# Patient Record
Sex: MALE | Race: WHITE | NOT HISPANIC OR LATINO | Employment: FULL TIME | URBAN - METROPOLITAN AREA
[De-identification: names, ages, dates, MRNs, and addresses within clinical notes are randomized per-mention and may not be internally consistent; named-entity substitution may affect disease eponyms.]

---

## 2017-10-25 ENCOUNTER — TELEPHONE (OUTPATIENT)
Dept: RHEUMATOLOGY | Facility: CLINIC | Age: 47
End: 2017-10-25

## 2017-10-25 NOTE — TELEPHONE ENCOUNTER
----- Message from Riya Miguel sent at 10/23/2017  4:32 PM CDT -----  Regarding: External Referral  Pt requesting NP consult with this Dr. Please call pt to schedule. Thank you

## 2017-10-27 ENCOUNTER — OFFICE VISIT (OUTPATIENT)
Dept: RHEUMATOLOGY | Facility: CLINIC | Age: 47
End: 2017-10-27
Payer: COMMERCIAL

## 2017-10-27 VITALS
WEIGHT: 239.63 LBS | DIASTOLIC BLOOD PRESSURE: 95 MMHG | HEART RATE: 86 BPM | RESPIRATION RATE: 20 BRPM | SYSTOLIC BLOOD PRESSURE: 129 MMHG

## 2017-10-27 DIAGNOSIS — M10.072 ACUTE IDIOPATHIC GOUT OF LEFT FOOT: ICD-10-CM

## 2017-10-27 DIAGNOSIS — M1A.09X0 IDIOPATHIC CHRONIC GOUT OF MULTIPLE SITES WITHOUT TOPHUS: Primary | ICD-10-CM

## 2017-10-27 PROCEDURE — 99999 PR PBB SHADOW E&M-EST. PATIENT-LVL III: CPT | Mod: PBBFAC,,, | Performed by: INTERNAL MEDICINE

## 2017-10-27 PROCEDURE — 99245 OFF/OP CONSLTJ NEW/EST HI 55: CPT | Mod: S$GLB,,, | Performed by: INTERNAL MEDICINE

## 2017-10-27 RX ORDER — CHLORTHALIDONE 25 MG/1
TABLET ORAL
COMMUNITY
Start: 2017-08-01

## 2017-10-27 RX ORDER — FEBUXOSTAT 40 MG/1
TABLET ORAL
COMMUNITY
Start: 2017-09-11 | End: 2018-07-23 | Stop reason: SDUPTHER

## 2017-10-27 RX ORDER — ROSUVASTATIN CALCIUM 10 MG/1
TABLET, COATED ORAL
COMMUNITY
Start: 2017-09-12 | End: 2018-03-29 | Stop reason: ALTCHOICE

## 2017-10-27 RX ORDER — CARVEDILOL 12.5 MG/1
TABLET ORAL
COMMUNITY
Start: 2017-09-25

## 2017-10-27 RX ORDER — AMLODIPINE BESYLATE 10 MG/1
TABLET ORAL
COMMUNITY
Start: 2017-10-12

## 2017-10-27 RX ORDER — PREDNISONE 10 MG/1
5 TABLET ORAL
COMMUNITY
Start: 2017-10-24 | End: 2018-03-29

## 2017-10-27 RX ORDER — INDOMETHACIN 50 MG/1
CAPSULE ORAL
COMMUNITY
Start: 2017-10-25 | End: 2018-03-29 | Stop reason: SDUPTHER

## 2017-10-27 RX ORDER — COLCHICINE 0.6 MG/1
TABLET ORAL
COMMUNITY
Start: 2017-10-18 | End: 2018-03-29

## 2017-10-27 RX ORDER — HYDROCODONE BITARTRATE AND ACETAMINOPHEN 7.5; 325 MG/1; MG/1
TABLET ORAL
COMMUNITY
Start: 2017-10-23

## 2017-10-27 ASSESSMENT — ROUTINE ASSESSMENT OF PATIENT INDEX DATA (RAPID3)
TOTAL RAPID3 SCORE: 3.67
PSYCHOLOGICAL DISTRESS SCORE: 1.1
WHEN YOU AWAKENED IN THE MORNING OVER THE LAST WEEK, PLEASE INDICATE THE AMOUNT OF TIME IT TAKES UNTIL YOU ARE AS LIMBER AS YOU WILL BE FOR THE DAY: 30 MIN
FATIGUE SCORE: .5
PAIN SCORE: 5
PATIENT GLOBAL ASSESSMENT SCORE: 4
MDHAQ FUNCTION SCORE: .6
AM STIFFNESS SCORE: 1, YES

## 2017-10-27 NOTE — LETTER
October 27, 2017      Alfa Rg MD  [address]  [place] [address]           [facility]  [address]  [place] [address]  Phone: [phone]  Fax: [phone]          Patient: Len Mcallister   MR Number: [id number]   YOB: 1970   Date of Visit: 10/27/2017       Dear Dr. Alfa Rg:    Thank you for referring Len Mcallister to me for evaluation. Attached you will find relevant portions of my assessment and plan of care.    If you have questions, please do not hesitate to call me. I look forward to following Len Mcallister along with you.    Sincerely,    Mekhi Goff MD    Enclosure  CC:  No Recipients    If you would like to receive this communication electronically, please contact [email or web address] or [phone] to request more information on [employer] Link access.    For providers and/or their staff who would like to refer a patient to [facility], please contact us through our one-stop-shop provider referral line, [facility], at [phone].    If you feel you have received this communication in error or would no longer like to receive these types of communications, please e-mail [email or web address]

## 2017-10-31 NOTE — PROGRESS NOTES
History of present illness: 46 showed gentleman was originally diagnosed as having gout 20 years ago.  It began initially in the right ankle with pain and swelling.  He was diagnosed clinically.  He was placed on indomethacin with good response.  The episode lasted one week.  He was having flares about every 6 months initially.  He was eventually placed on allopurinol.  He is uncertain how long he was on it.  He was told it was stopped because his uric acid level went up.  His dose was never increased.  He apparently had less attacks when he was on allopurinol.  The allopurinol was restarted one more time but he has not taken it for the past 9 years.  He had been having attacks every 2-3 months.  Increase also when he takes indomethacin.  One year ago he was placed on Uloric.  He had increased gout attacks and stop the Ulorick.  5 weeks ago it was restarted.  He was also placed on colchicine.  He took the colchicine for one week but developed muscle cramps.  He stopped both the colchicine and his statin.  He subsequently developed attacks in both knees and both ankles.  He had an aspiration of the right knee and was told it was negative.  He then had a aspiration of the left knee and was told he had crystals.  He was started on prednisone 20 mg daily which helped.  He was cut down to 5 mg daily but his disease flared.  He went back up to 20 mg and is now tapering the dose again.  He has also been taking indomethacin.  His Uloric dose was originally 80 mg but he was cut to 40 mg.  He was told his most recent uric acid level was normal.    He has had no pain between the attacks.  He has had some stiffness in the hands.  No fever, headache, rash, conjunctivitis, oral ulcers, dry eyes or mouth, Raynaud's phenomenon, pleurisy, chronic or bloody diarrhea, vaginal or urethral discharge or ulcer, numbness or tingling, blood clots or phlebitis.  He has no history kidney stones.  He has no family history of gout or  arthritis.    Systems review:  Gen.: Weight has decreased 15 pounds  GI: No abdominal pain or peptic ulcer disease.  No liver problems.  : No kidney or bladder problems    Physical examination:  Skin: No rashes  ENT: Adequate tears and saliva  Chest: Clear to auscultation and percussion  Cardiac: No murmurs, gallops, rubs  Abdomen: No organomegaly or masses.  No tenderness to palpation  Extremities: No pedal edema  Musculoskeletal: Cervical spine, shoulders, elbows, wrist are unremarkable.  He has a Dupuytren's contracture of the right fourth finger.  He has bony hypertrophy of the PIPs and DIPs.  He has no definite tophi.  Lumbar spine and hips are unremarkable.  He has decreased range of motion of the left knee but no effusion.  He has no tenderness to palpation.  Right knee is unremarkable.  He has tenderness of the left subtalar joint.  He has no swelling but some increased warmth.  Right foot and ankle are unremarkable.    Assessment:  1.  His history is compatible with gouty arthritis.  I will assume that the joint aspiration showed monosodium urate crystals  2.  He has underlying osteoarthritis    Plans:  1.  Continue Uloric 40 mg daily. Uloric is notorious for causing acute gout attacks when first started then is allopurinol.  It is recommended that he actually be started on the lower dose of the medication and the dosage increased only if he did not obtain a uric acid level less than 6  2.  He will need prophylactic therapy for 3 months as his total body urate load decreases.  I would recommend continuing indomethacin 50 mg twice daily  3.  He is to continue his prednisone taper  4.  I will see him in follow-up in 3 months.

## 2018-03-29 ENCOUNTER — OFFICE VISIT (OUTPATIENT)
Dept: RHEUMATOLOGY | Facility: CLINIC | Age: 48
End: 2018-03-29
Payer: COMMERCIAL

## 2018-03-29 ENCOUNTER — HOSPITAL ENCOUNTER (OUTPATIENT)
Dept: RADIOLOGY | Facility: HOSPITAL | Age: 48
Discharge: HOME | End: 2018-03-29
Attending: INTERNAL MEDICINE
Payer: COMMERCIAL

## 2018-03-29 ENCOUNTER — TELEPHONE (OUTPATIENT)
Dept: RHEUMATOLOGY | Facility: CLINIC | Age: 48
End: 2018-03-29

## 2018-03-29 VITALS
DIASTOLIC BLOOD PRESSURE: 90 MMHG | HEART RATE: 65 BPM | HEIGHT: 73 IN | WEIGHT: 260 LBS | BODY MASS INDEX: 34.46 KG/M2 | SYSTOLIC BLOOD PRESSURE: 136 MMHG

## 2018-03-29 DIAGNOSIS — M1A.09X0 IDIOPATHIC CHRONIC GOUT OF MULTIPLE SITES WITHOUT TOPHUS: ICD-10-CM

## 2018-03-29 DIAGNOSIS — M1A.09X0 IDIOPATHIC CHRONIC GOUT OF MULTIPLE SITES WITHOUT TOPHUS: Primary | ICD-10-CM

## 2018-03-29 PROCEDURE — 73630 X-RAY EXAM OF FOOT: CPT | Mod: 50,TC

## 2018-03-29 PROCEDURE — 73630 X-RAY EXAM OF FOOT: CPT | Mod: 26,50,, | Performed by: RADIOLOGY

## 2018-03-29 PROCEDURE — 99999 PR PBB SHADOW E&M-EST. PATIENT-LVL III: CPT | Mod: PBBFAC,,, | Performed by: INTERNAL MEDICINE

## 2018-03-29 PROCEDURE — 99213 OFFICE O/P EST LOW 20 MIN: CPT | Mod: S$GLB,,, | Performed by: INTERNAL MEDICINE

## 2018-03-29 PROCEDURE — 73565 X-RAY EXAM OF KNEES: CPT | Mod: 26,,, | Performed by: RADIOLOGY

## 2018-03-29 PROCEDURE — 73565 X-RAY EXAM OF KNEES: CPT | Mod: TC

## 2018-03-29 RX ORDER — TRAMADOL HYDROCHLORIDE 50 MG/1
TABLET ORAL
COMMUNITY
Start: 2018-03-06

## 2018-03-29 RX ORDER — INDOMETHACIN 50 MG/1
50 CAPSULE ORAL
Qty: 90 CAPSULE | Refills: 4 | Status: SHIPPED | OUTPATIENT
Start: 2018-03-29 | End: 2018-04-28

## 2018-03-29 RX ORDER — ATORVASTATIN CALCIUM 10 MG/1
10 TABLET, FILM COATED ORAL DAILY
COMMUNITY

## 2018-03-29 RX ORDER — ATORVASTATIN CALCIUM 10 MG/1
TABLET, FILM COATED ORAL
COMMUNITY
Start: 2018-02-22 | End: 2018-03-29 | Stop reason: ALTCHOICE

## 2018-03-29 NOTE — PROGRESS NOTES
History of present illness: 47-year-old gentleman I saw one time in October.  He has a 20 year history of gout.  He had been treated in the past with allopurinol but had not been on the medication for 10 years.  Approximately 18 months ago he started having more frequent attacks.  At that time he was placed on Uloric, initially 80 mg but then 40 mg.  He was treated with colchicine but developed muscle cramps.  He has been taking indomethacin as needed.  He had been on prednisone in the past.  He apparently had crystal seen on at least one aspiration.  I do not have any of his old records available.  At the time I saw him I recommended he take indomethacin on a regular basis but he is only been taking it for flares.    He remains on Uloric 40 mg daily.  He has had frequent episodes of joint pain but not necessarily joint swelling.  His most recent episode was in the lateral aspect of the left knee.  He had appears to be some tendinitis.  He has also had pain in the anserine bursa and on the medial aspect of the knee.  He has had swelling in his elbow.  He has had pain in his toes.  He thinks the indomethacin has been helping.  He has had no recent blood work.  He denies other recent medical problems.    Physical examination:  Musculoskeletal: He has no tophi.  He has full range of motion of all joints.  He has no soft tissue swelling, erythema, or increased warmth.  He has no tender areas to palpation.    Assessment: At the present time he has intercritical gout.  Some of what he has been calling gout flairs may actually be more tendinitis and bursitis.    Plans:  1.  Laboratory studies are obtained  2.  I have ordered x-rays to rule out damage due to the gout  3.  I recommend taking indomethacin 3 times daily on a regular basis  4.  He is to continue Uloric at the present time although I may change him back to allopurinol in the future  5.  Return to see me in 3 months

## 2018-03-29 NOTE — TELEPHONE ENCOUNTER
"Notified pt of lab results, per Dr. Goff "Uric acid level is right where it is supposed to be.  The other blood work is all normal.  There may be some gout damage on the left first toe.  There is also changes of heel spurs and osteoarthritis in the feet.  The knee x-ray looks okay. " Pt verbalized understanding  "

## 2018-07-23 ENCOUNTER — OFFICE VISIT (OUTPATIENT)
Dept: RHEUMATOLOGY | Facility: CLINIC | Age: 48
End: 2018-07-23
Payer: COMMERCIAL

## 2018-07-23 ENCOUNTER — LAB VISIT (OUTPATIENT)
Dept: LAB | Facility: HOSPITAL | Age: 48
End: 2018-07-23
Attending: INTERNAL MEDICINE
Payer: COMMERCIAL

## 2018-07-23 VITALS
SYSTOLIC BLOOD PRESSURE: 126 MMHG | DIASTOLIC BLOOD PRESSURE: 87 MMHG | WEIGHT: 260 LBS | HEART RATE: 69 BPM | BODY MASS INDEX: 34.3 KG/M2

## 2018-07-23 DIAGNOSIS — M1A.09X0 IDIOPATHIC CHRONIC GOUT OF MULTIPLE SITES WITHOUT TOPHUS: Primary | ICD-10-CM

## 2018-07-23 DIAGNOSIS — M1A.09X0 IDIOPATHIC CHRONIC GOUT OF MULTIPLE SITES WITHOUT TOPHUS: ICD-10-CM

## 2018-07-23 DIAGNOSIS — M15.9 PRIMARY OSTEOARTHRITIS INVOLVING MULTIPLE JOINTS: ICD-10-CM

## 2018-07-23 LAB
ALBUMIN SERPL BCP-MCNC: 4.3 G/DL
ALP SERPL-CCNC: 96 U/L
ALT SERPL W/O P-5'-P-CCNC: 35 U/L
ANION GAP SERPL CALC-SCNC: 8 MMOL/L
AST SERPL-CCNC: 22 U/L
BASOPHILS # BLD AUTO: 0.03 K/UL
BASOPHILS NFR BLD: 0.4 %
BILIRUB SERPL-MCNC: 0.7 MG/DL
BUN SERPL-MCNC: 15 MG/DL
CALCIUM SERPL-MCNC: 9.7 MG/DL
CHLORIDE SERPL-SCNC: 107 MMOL/L
CO2 SERPL-SCNC: 25 MMOL/L
CREAT SERPL-MCNC: 1.3 MG/DL
DIFFERENTIAL METHOD: NORMAL
EOSINOPHIL # BLD AUTO: 0.1 K/UL
EOSINOPHIL NFR BLD: 1.8 %
ERYTHROCYTE [DISTWIDTH] IN BLOOD BY AUTOMATED COUNT: 13.4 %
EST. GFR  (AFRICAN AMERICAN): >60 ML/MIN/1.73 M^2
EST. GFR  (NON AFRICAN AMERICAN): >60 ML/MIN/1.73 M^2
GLUCOSE SERPL-MCNC: 118 MG/DL
HCT VFR BLD AUTO: 44.9 %
HGB BLD-MCNC: 15.5 G/DL
IMM GRANULOCYTES # BLD AUTO: 0.03 K/UL
IMM GRANULOCYTES NFR BLD AUTO: 0.4 %
LYMPHOCYTES # BLD AUTO: 2.3 K/UL
LYMPHOCYTES NFR BLD: 30.6 %
MCH RBC QN AUTO: 28.4 PG
MCHC RBC AUTO-ENTMCNC: 34.5 G/DL
MCV RBC AUTO: 82 FL
MONOCYTES # BLD AUTO: 0.6 K/UL
MONOCYTES NFR BLD: 7.5 %
NEUTROPHILS # BLD AUTO: 4.4 K/UL
NEUTROPHILS NFR BLD: 59.3 %
NRBC BLD-RTO: 0 /100 WBC
PLATELET # BLD AUTO: 172 K/UL
PMV BLD AUTO: 9.8 FL
POTASSIUM SERPL-SCNC: 4.4 MMOL/L
PROT SERPL-MCNC: 7.4 G/DL
RBC # BLD AUTO: 5.46 M/UL
SODIUM SERPL-SCNC: 140 MMOL/L
URATE SERPL-MCNC: 5.7 MG/DL
WBC # BLD AUTO: 7.36 K/UL

## 2018-07-23 PROCEDURE — 99999 PR PBB SHADOW E&M-EST. PATIENT-LVL III: CPT | Mod: PBBFAC,,, | Performed by: INTERNAL MEDICINE

## 2018-07-23 PROCEDURE — 3008F BODY MASS INDEX DOCD: CPT | Mod: CPTII,S$GLB,, | Performed by: INTERNAL MEDICINE

## 2018-07-23 PROCEDURE — 85025 COMPLETE CBC W/AUTO DIFF WBC: CPT

## 2018-07-23 PROCEDURE — 80053 COMPREHEN METABOLIC PANEL: CPT

## 2018-07-23 PROCEDURE — 99213 OFFICE O/P EST LOW 20 MIN: CPT | Mod: S$GLB,,, | Performed by: INTERNAL MEDICINE

## 2018-07-23 PROCEDURE — 36415 COLL VENOUS BLD VENIPUNCTURE: CPT

## 2018-07-23 PROCEDURE — 84550 ASSAY OF BLOOD/URIC ACID: CPT

## 2018-07-23 RX ORDER — INDOMETHACIN 50 MG/1
CAPSULE ORAL
COMMUNITY
Start: 2018-05-28

## 2018-07-23 RX ORDER — FEBUXOSTAT 40 MG/1
40 TABLET ORAL DAILY
Qty: 90 TABLET | Refills: 3 | Status: SHIPPED | OUTPATIENT
Start: 2018-07-23 | End: 2018-09-18 | Stop reason: SDUPTHER

## 2018-07-23 NOTE — PROGRESS NOTES
History of present illness:  47-year-old gentleman with a long history of gout.  I have been following him since October.  He had taken allopurinol in the past but had not done well with the allopurinol.  He was already on Uloric not taking it properly.  I told him to take indomethacin along with his Uloric any has had no further attacks.  He was still having some joint pain when I saw him in March.  He was still not taking indomethacin regularly.  Since then he has been taking it twice daily.  He has had no further attacks and no further pain.  I did x-rays which showed osteoarthritis of the knee and bilateral heel spurs.  He did have 1 area of erosion in the toes.  He has had no other medical problems.    Physical examination:  Musculoskeletal:  Good range of motion of all joints.  No synovitis.  No tender areas to palpation.    Assessment:  1.  Inter critical gout  2.  Osteoarthritis    Plans:  1.  Laboratory studies are obtained  2.  Continue medications as before  3.  Return to see me in 12 months

## 2018-08-02 ENCOUNTER — TELEPHONE (OUTPATIENT)
Dept: RHEUMATOLOGY | Facility: CLINIC | Age: 48
End: 2018-08-02

## 2018-08-02 NOTE — TELEPHONE ENCOUNTER
"Notified pt of lab results, per Dr. Goff "Laboratory studies are all normal.  Continue the same dose of Uloric" Pt verbalized understanding  "

## 2018-09-18 RX ORDER — FEBUXOSTAT 40 MG/1
40 TABLET ORAL DAILY
Qty: 90 TABLET | Refills: 3 | Status: SHIPPED | OUTPATIENT
Start: 2018-09-18 | End: 2019-09-18

## 2019-09-24 RX ORDER — FEBUXOSTAT 40 MG/1
40 TABLET, FILM COATED ORAL DAILY
Qty: 90 TABLET | Refills: 0 | Status: SHIPPED | OUTPATIENT
Start: 2019-09-24